# Patient Record
Sex: FEMALE | Race: ASIAN | Employment: OTHER | URBAN - METROPOLITAN AREA
[De-identification: names, ages, dates, MRNs, and addresses within clinical notes are randomized per-mention and may not be internally consistent; named-entity substitution may affect disease eponyms.]

---

## 2023-10-03 ENCOUNTER — APPOINTMENT (OUTPATIENT)
Dept: OTOLARYNGOLOGY | Facility: CLINIC | Age: 78
End: 2023-10-03
Payer: MEDICARE

## 2023-10-03 ENCOUNTER — APPOINTMENT (OUTPATIENT)
Dept: AUDIOLOGY | Facility: CLINIC | Age: 78
End: 2023-10-03
Payer: MEDICARE

## 2023-11-01 ENCOUNTER — CLINICAL SUPPORT (OUTPATIENT)
Dept: ORTHOPEDIC SURGERY | Facility: CLINIC | Age: 78
End: 2023-11-01
Payer: MEDICARE

## 2023-11-01 ENCOUNTER — OFFICE VISIT (OUTPATIENT)
Dept: ORTHOPEDIC SURGERY | Facility: CLINIC | Age: 78
End: 2023-11-01
Payer: MEDICARE

## 2023-11-01 VITALS — BODY MASS INDEX: 18.95 KG/M2 | WEIGHT: 103 LBS | HEIGHT: 62 IN

## 2023-11-01 DIAGNOSIS — M25.551 HIP PAIN, RIGHT: ICD-10-CM

## 2023-11-01 DIAGNOSIS — M24.151 DEGENERATIVE TEAR OF ACETABULAR LABRUM OF RIGHT HIP: Primary | ICD-10-CM

## 2023-11-01 PROCEDURE — 73502 X-RAY EXAM HIP UNI 2-3 VIEWS: CPT | Mod: RIGHT SIDE | Performed by: ORTHOPAEDIC SURGERY

## 2023-11-01 PROCEDURE — 1159F MED LIST DOCD IN RCRD: CPT | Performed by: ORTHOPAEDIC SURGERY

## 2023-11-01 PROCEDURE — 99203 OFFICE O/P NEW LOW 30 MIN: CPT | Performed by: ORTHOPAEDIC SURGERY

## 2023-11-01 PROCEDURE — 1126F AMNT PAIN NOTED NONE PRSNT: CPT | Performed by: ORTHOPAEDIC SURGERY

## 2023-11-01 PROCEDURE — 1036F TOBACCO NON-USER: CPT | Performed by: ORTHOPAEDIC SURGERY

## 2023-11-01 RX ORDER — GABAPENTIN 300 MG/1
300 CAPSULE ORAL 3 TIMES DAILY
COMMUNITY
Start: 2023-06-29

## 2023-11-01 RX ORDER — LISINOPRIL 20 MG/1
TABLET ORAL EVERY 24 HOURS
COMMUNITY

## 2023-11-01 RX ORDER — TRAZODONE HYDROCHLORIDE 100 MG/1
TABLET ORAL
COMMUNITY
Start: 2012-06-11

## 2023-11-01 RX ORDER — PAROXETINE 10 MG/1
10 TABLET, FILM COATED ORAL DAILY
COMMUNITY
Start: 2023-03-06

## 2023-11-01 RX ORDER — LANOLIN ALCOHOL/MO/W.PET/CERES
CREAM (GRAM) TOPICAL
COMMUNITY

## 2023-11-01 RX ORDER — MULTIVITAMIN
1 TABLET ORAL
COMMUNITY

## 2023-11-01 ASSESSMENT — ENCOUNTER SYMPTOMS
HEADACHES: 0
RHINORRHEA: 0
COUGH: 0
VOMITING: 0
SORE THROAT: 1
ABDOMINAL PAIN: 0
NECK PAIN: 1
DIARRHEA: 0

## 2023-11-01 ASSESSMENT — PAIN SCALES - GENERAL: PAINLEVEL_OUTOF10: 0 - NO PAIN

## 2023-11-01 ASSESSMENT — PAIN - FUNCTIONAL ASSESSMENT: PAIN_FUNCTIONAL_ASSESSMENT: 0-10

## 2023-11-01 NOTE — PROGRESS NOTES
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  The medical intake form was reviewed and scanned into the electronic medical record for future use.  History is otherwise negative except as stated in the history of present illness.    Physical exam:    General: Alert and oriented to person place and time.  No acute distress and breathing comfortably, pleasant and cooperative with examination.    Head and neck exam: Head is normocephalic atraumatic.    Neck: Supple no visible swelling or deformity.    Cardiovascular: Good perfusion to affected extremities without signs or symptoms of chest pain.    Lungs no audible wheezing or labored breathing on examination.    Abdominal exam: Nondistended nontender    Extremities: The affected right hip was examined and inspected.  There was tenderness to touch along the groin and over the lateral aspect of the hip over the bursal area.  Hip joint occasionally displayed catching, locking and mechanical symptoms.    The skin was intact without breakdown or open wound.  Old incisions of present were all healed.  There was mild crepitus seen with internal and external rotation and range of motion without evidence of gross instability.    Inspection of the low back showed normal curvature integrity of the skin.  The strength and stability of the low back and ligaments were within normal limits.    There was a normal straight leg raise with no foot drop, numbness or tingling in the bilateral lower extremities.  Sensation, reflexes and pulses in the foot and ankle are preserved and present.  There was no obvious effusion.  Range  of motion showed flexion to 90 degrees, abduction to 20 degrees, external rotation to 5 degrees and 0 degrees of internal rotation.  The patient had the ability to bear weight but with discomfort.  The patient's gait Antalgic and secondary to discomfort        Diagnostic studies: X-rays 2 views right hip done at our [place] office show real no bony abnormality well-preserved hip joint  But limited view we have shows fairly severe osteoarthritic change of the lumbar sacral spine  Impression: Right hip groin pain possible labral tear      Plan: MRI right hip    Patient has had conservative treatment and hip groin pain for over 1 year    If the MRI shows labral tearing directed fluoroscopic intra-articular injection may be recommended    If the hip MRI is normal we may refer her to one of our spine colleagues for evaluation

## 2023-11-02 ENCOUNTER — OFFICE VISIT (OUTPATIENT)
Dept: OTOLARYNGOLOGY | Facility: CLINIC | Age: 78
End: 2023-11-02
Payer: MEDICARE

## 2023-11-02 VITALS
HEIGHT: 62 IN | DIASTOLIC BLOOD PRESSURE: 77 MMHG | BODY MASS INDEX: 18.95 KG/M2 | SYSTOLIC BLOOD PRESSURE: 164 MMHG | WEIGHT: 103 LBS | TEMPERATURE: 96.4 F

## 2023-11-02 DIAGNOSIS — R07.0 THROAT PAIN: Primary | ICD-10-CM

## 2023-11-02 DIAGNOSIS — H92.01 RIGHT EAR PAIN: ICD-10-CM

## 2023-11-02 PROCEDURE — 1160F RVW MEDS BY RX/DR IN RCRD: CPT | Performed by: OTOLARYNGOLOGY

## 2023-11-02 PROCEDURE — 1036F TOBACCO NON-USER: CPT | Performed by: OTOLARYNGOLOGY

## 2023-11-02 PROCEDURE — 1126F AMNT PAIN NOTED NONE PRSNT: CPT | Performed by: OTOLARYNGOLOGY

## 2023-11-02 PROCEDURE — 1159F MED LIST DOCD IN RCRD: CPT | Performed by: OTOLARYNGOLOGY

## 2023-11-02 PROCEDURE — 31575 DIAGNOSTIC LARYNGOSCOPY: CPT | Performed by: OTOLARYNGOLOGY

## 2023-11-02 PROCEDURE — 99203 OFFICE O/P NEW LOW 30 MIN: CPT | Performed by: OTOLARYNGOLOGY

## 2023-11-02 RX ORDER — ATORVASTATIN CALCIUM 20 MG/1
20 TABLET, FILM COATED ORAL
COMMUNITY
Start: 2019-05-20

## 2023-11-02 NOTE — PROGRESS NOTES
Impression:  1. Throat pain        2. Right ear pain           RECOMMENDATIONS/PLAN :  I reassured the patient there is no evidence of any mass or swelling along her upper airway.  I want her to avoid pushing on that right side of her neck and see if her pain goes away.  I reassured the patient that her ear appears normal and more than likely she has referred pain to the ear from her right neck.  She will let me know if she starts clearing her throat more frequently or if she develops a fullness sensation in the throat.  At that point we would start her on omeprazole 20 mg daily.  She will continue to drink plenty of water and avoid excessive caffeine.  She will call over the next 4 weeks and let me know how she is doing    Subjective   Patient ID:     Johny Nicholson is a 78 y.o. female who presents to the office today complaining of right neck pain that typically occurs when she is pushing deep along the right portion of her neck with her fingers.  She denies any trauma to the head or neck.  She further denies choking on foods or liquids.  She has not had any evidence of hemoptysis or hematemesis.  No unexplained loss of weight night sweats or fatigue.  She does complain of some right ear pain.  No drainage from her ears and no changes in hearing.  No upper respiratory complaints nasal congestion sinus issues fever or chills.    ROS:  A detailed 12 system review of systems is noted on the intake form has been reviewed with the patient with details noted in the HPI and scanned into the patient's medical record.    Objective     No past medical history on file.     No past surgical history on file.     No Known Allergies       Current Outpatient Medications:     atorvastatin (Lipitor) 20 mg tablet, Take 1 tablet (20 mg) by mouth once daily., Disp: , Rfl:     lisinopril 20 mg tablet, once every 24 hours., Disp: , Rfl:     multivit with min-folic acid 0.4 mg tablet, Take 1 tablet by mouth., Disp: , Rfl:     PARoxetine  "(Paxil) 10 mg tablet, Take 1 tablet (10 mg) by mouth once daily., Disp: , Rfl:     pyridoxine (Vitamin B-6) 50 mg tablet, 1 tablet Orally, Disp: , Rfl:     traZODone (Desyrel) 100 mg tablet, Trazodone HCl06/11/201250 MGOnce a day as needed Orally 90 daysTsYuni collinsRashidaill, Disp: , Rfl:     gabapentin (Neurontin) 300 mg capsule, Take 1 capsule (300 mg) by mouth 3 times a day., Disp: , Rfl:     oxymetazoline 0.05 % nasal spray, every 12 hours., Disp: , Rfl:      Tobacco Use: Medium Risk (11/2/2023)    Patient History     Smoking Tobacco Use: Former     Smokeless Tobacco Use: Never     Passive Exposure: Not on file        Alcohol Use: Not on file        Social History     Substance and Sexual Activity   Drug Use Not on file        Physical Exam:  Visit Vitals  /77   Temp 35.8 °C (96.4 °F) (Temporal)   Ht 1.575 m (5' 2\")   Wt 46.7 kg (103 lb)   BMI 18.84 kg/m²   Smoking Status Former   BSA 1.43 m²      General: Patient is alert, oriented, cooperative in no apparent distress.  Head: Normocephalic, atraumatic.  Eyes: PERRL, EOMI, Conjunctiva is clear. No nystagmus.  Ears: Right Ear-- Pinna is normal.  External auditory canal is patent, no lesions. Tympanic membrane is [intact, translucent and has good mobility with my pneumatic otoscope. No effusion].  Mastoid is nontender.  Left ear-- Pinna is normal.  External auditory canal is patent, no lesions.  Tympanic membrane is [intact, translucent and has good mobility with my pneumatic otoscope.  No effusion].  Mastoid is nontender.  Nose: Septum is relatively straight.  No septal perforation or lesions. No septal hematoma/ seroma.  No signs of bleeding.  Inferior turbinates are normal.   No evidence of intranasal polyps.    Throat:  Floor of mouth is clear, no masses.  Tongue appears normal, no lesions or masses. Gums, gingiva, buccal mucosa appear pink and moist, no lesions. Teeth are in good repair.  No obvious dental infections.  Peritonsillar regions appear " symmetric without swelling.  Hard and soft palate appear normal, no obvious cleft. Uvula is midline.  Oropharynx: No lesions. Retropharyngeal wall is flat.  No active postnasal drip.  Neck: Supple,  no lymphadenopathy.  No masses.  Salivary Glands: Symmetric bilaterally.  No palpable masses.  No evidence of acute infection or salivary stones  Neurologic: Cranial Nerves 2-12 are grossly intact without focal deficits. Cerebellar function testing is normal.     Results:   []  Procedure:   Pre Op Diagnosis: Right throat pain with referred right ear pain-rule out upper airway mass  Post Op Diagnosis: Same  Procedure: Flexible Nasopharyngolaryngoscopy  Surgeon: Luis Brooks DO  Assist: None  Anesthesia: Topical Lidocaine 4%/ 0.05% Afrin 1:1 mixture  EBL: None  Complications: None  Disposition: The patient tolerated the procedure well. There were no complications.    Procedure:  After informed consent was obtained with the risks, benefits, complications and alternatives explained to the patient/ guardian, the patient was sat up in the ENT chair and the nose was anesthetized and decongested with topical  4% lidocaine and 0.05% Afrin. After allowing this to take effect, the flexible nasopharyngoscope was advanced thru the nostrils and down to the larynx. The following areas were visualized:  The nasal passages, septum, nasopharynx, sinus ostia, base of tongue, epiglottis, true and false vocal folds, arytenoids, post cricoid region and subglottis were all examined and found to be normal except as follows:  Septum is relatively straight.  Her ostiomeatal complexes are clear bilaterally.  No pus polyps or lesions.  Nasopharynx is clear.  No postnasal drip.  Base of tongue reveals prominent blood vessels with mild lingual tonsil hypertrophy.  No masses.  Vallecula is clear.  Epiglottis is normal.  True and false vocal cords are approximating equally bilaterally.  No nodules polyps or lesions.  Piriform sinuses are clear.   Arytenoids show mild edema consistent with mild laryngopharyngeal reflux.  Subglottis is clear.      The patient tolerated the procedure well and there were no complications.      Luis Brooks, DO

## 2023-11-24 ENCOUNTER — APPOINTMENT (OUTPATIENT)
Dept: RADIOLOGY | Facility: CLINIC | Age: 78
End: 2023-11-24
Payer: MEDICARE

## 2023-11-24 ENCOUNTER — APPOINTMENT (OUTPATIENT)
Dept: RADIOLOGY | Facility: HOSPITAL | Age: 78
End: 2023-11-24
Payer: MEDICARE

## 2023-11-27 ENCOUNTER — ANCILLARY PROCEDURE (OUTPATIENT)
Dept: RADIOLOGY | Facility: CLINIC | Age: 78
End: 2023-11-27
Payer: MEDICARE

## 2023-11-27 DIAGNOSIS — M24.151 DEGENERATIVE TEAR OF ACETABULAR LABRUM OF RIGHT HIP: ICD-10-CM

## 2023-11-27 PROCEDURE — 73721 MRI JNT OF LWR EXTRE W/O DYE: CPT | Mod: RT

## 2023-11-27 PROCEDURE — 73721 MRI JNT OF LWR EXTRE W/O DYE: CPT | Mod: RIGHT SIDE | Performed by: RADIOLOGY

## 2023-12-13 ENCOUNTER — OFFICE VISIT (OUTPATIENT)
Dept: ORTHOPEDIC SURGERY | Facility: CLINIC | Age: 78
End: 2023-12-13
Payer: MEDICARE

## 2023-12-13 DIAGNOSIS — M24.151 DEGENERATIVE TEAR OF ACETABULAR LABRUM OF RIGHT HIP: ICD-10-CM

## 2023-12-13 PROCEDURE — 1036F TOBACCO NON-USER: CPT | Performed by: ORTHOPAEDIC SURGERY

## 2023-12-13 PROCEDURE — 1160F RVW MEDS BY RX/DR IN RCRD: CPT | Performed by: ORTHOPAEDIC SURGERY

## 2023-12-13 PROCEDURE — 1126F AMNT PAIN NOTED NONE PRSNT: CPT | Performed by: ORTHOPAEDIC SURGERY

## 2023-12-13 PROCEDURE — 99213 OFFICE O/P EST LOW 20 MIN: CPT | Performed by: ORTHOPAEDIC SURGERY

## 2023-12-13 PROCEDURE — 1159F MED LIST DOCD IN RCRD: CPT | Performed by: ORTHOPAEDIC SURGERY

## 2023-12-15 DIAGNOSIS — M24.151 DEGENERATIVE TEAR OF ACETABULAR LABRUM OF RIGHT HIP: Primary | ICD-10-CM

## 2023-12-15 RX ORDER — OXYMETAZOLINE HYDROCHLORIDE 0.05 G/100ML
2 SPRAY NASAL 2 TIMES DAILY
COMMUNITY

## 2023-12-15 RX ORDER — TRAZODONE HYDROCHLORIDE 100 MG/1
100 TABLET ORAL NIGHTLY PRN
COMMUNITY

## 2023-12-15 RX ORDER — ATORVASTATIN CALCIUM 20 MG/1
20 TABLET, FILM COATED ORAL DAILY
COMMUNITY
Start: 2023-10-23

## 2023-12-15 RX ORDER — GABAPENTIN 300 MG/1
300 CAPSULE ORAL 3 TIMES DAILY
COMMUNITY

## 2023-12-15 RX ORDER — PYRIDOXINE HCL (VITAMIN B6) 50 MG
50 TABLET ORAL DAILY
COMMUNITY

## 2023-12-15 RX ORDER — PAROXETINE 10 MG/1
10 TABLET, FILM COATED ORAL EVERY MORNING
COMMUNITY
Start: 2023-11-21

## 2023-12-15 RX ORDER — LISINOPRIL 20 MG/1
20 TABLET ORAL DAILY
COMMUNITY
Start: 2023-10-23

## 2024-01-03 ENCOUNTER — HOSPITAL ENCOUNTER (OUTPATIENT)
Dept: INTERVENTIONAL RADIOLOGY/VASCULAR | Age: 79
Discharge: HOME | End: 2024-01-05
Payer: COMMERCIAL

## 2024-01-03 VITALS
RESPIRATION RATE: 16 BRPM | SYSTOLIC BLOOD PRESSURE: 150 MMHG | DIASTOLIC BLOOD PRESSURE: 74 MMHG | HEART RATE: 51 BPM | OXYGEN SATURATION: 98 %

## 2024-01-03 DIAGNOSIS — M24.151 DEGENERATIVE TEAR OF ACETABULAR LABRUM OF RIGHT HIP: ICD-10-CM

## 2024-01-03 PROCEDURE — 20610 DRAIN/INJ JOINT/BURSA W/O US: CPT

## 2024-01-03 PROCEDURE — 6360000002 HC RX W HCPCS: Performed by: RADIOLOGY

## 2024-01-03 PROCEDURE — 6360000004 HC RX CONTRAST MEDICATION: Performed by: RADIOLOGY

## 2024-01-03 PROCEDURE — 2500000003 HC RX 250 WO HCPCS: Performed by: RADIOLOGY

## 2024-01-03 PROCEDURE — 77002 NEEDLE LOCALIZATION BY XRAY: CPT

## 2024-01-03 RX ORDER — LIDOCAINE HYDROCHLORIDE 20 MG/ML
INJECTION, SOLUTION INFILTRATION; PERINEURAL PRN
Status: COMPLETED | OUTPATIENT
Start: 2024-01-03 | End: 2024-01-03

## 2024-01-03 RX ORDER — BUPIVACAINE HYDROCHLORIDE 5 MG/ML
INJECTION, SOLUTION PERINEURAL PRN
Status: COMPLETED | OUTPATIENT
Start: 2024-01-03 | End: 2024-01-03

## 2024-01-03 RX ORDER — IOPAMIDOL 408 MG/ML
INJECTION, SOLUTION INTRATHECAL PRN
Status: COMPLETED | OUTPATIENT
Start: 2024-01-03 | End: 2024-01-03

## 2024-01-03 RX ORDER — TRIAMCINOLONE ACETONIDE 40 MG/ML
INJECTION, SUSPENSION INTRA-ARTICULAR; INTRAMUSCULAR PRN
Status: COMPLETED | OUTPATIENT
Start: 2024-01-03 | End: 2024-01-03

## 2024-01-03 RX ADMIN — IOPAMIDOL 5 ML: 408 INJECTION, SOLUTION INTRATHECAL at 11:18

## 2024-01-03 RX ADMIN — LIDOCAINE HYDROCHLORIDE 8 ML: 20 INJECTION, SOLUTION INFILTRATION; PERINEURAL at 11:17

## 2024-01-03 RX ADMIN — BUPIVACAINE HYDROCHLORIDE 3 ML: 5 INJECTION, SOLUTION PERINEURAL at 11:19

## 2024-01-03 RX ADMIN — TRIAMCINOLONE ACETONIDE 80 MG: 40 INJECTION, SUSPENSION INTRA-ARTICULAR; INTRAMUSCULAR at 11:19

## 2024-01-03 NOTE — DISCHARGE INSTRUCTIONS
follow all instructions on the label.    INSTRUCTIONS OR COMMENTS RELATIVE TO SPECIFIC EXAM PERFORMED:   - Put ice or a cold pack on the area for 10 to 20 minutes at a time. Put a thin cloth between the ice and your skin.  - You may have some soreness or swelling in the joint.  - You may hear your joint click or make other noises for a few days.    Call your doctor now or seek immediate medical care if:    You have signs of infection, such as:  Increased pain, swelling, warmth, or redness.  Red streaks leading from the site.  Pus draining from the site.  A fever, greater than 100 degrees F  For large amount of bleeding or drainage.       Please contact Dr. Arroyo's office with any questions or concerns post procedure at [phone]. You may also call [phone] and ask to speak with a Radiology RN.  If you are unable to contact the above physician and you feel you have a major problem, please go to the Emergency Room for treatment.

## 2024-01-03 NOTE — OR NURSING
NO SEDATION    1055 Pt brought to specials. Pt alert and oriented. Procedure explained and consent signed. Minimally assisted pt onto table in supine position. States pain 3/10.  History, allergies and medications verified. Vital signs obtained.    1110 Dr Arroyo marked site right hip site using fluoro imaging.    1111 Time out performed.     1114 Right hip site marked then cleansed with chloraprep. After 3 minute dry time, prepped and draped in a sterile fashion.    1117 2% lidocaine used to numb procedure site, see eMar.     1118 Spinal needle 20G x 3.5\" inserted and contrast given to confirm placement using fluoro guidance, see eMar.     1119 Medication cocktail instilled through spinal needle under fluoro guidance, see eMar.     Pt tolerating procedure well and verbal support given throughout.     Needle removed, band aid applied, no bleeding noted.     Pt assisted off table. Ambulating without difficulty. States pain has improved. Discharge instructions provided and pt verbalized understanding.    Accompanied pt to waiting room for d/c home.

## 2024-01-17 ENCOUNTER — APPOINTMENT (OUTPATIENT)
Dept: RADIOLOGY | Facility: HOSPITAL | Age: 79
End: 2024-01-17
Payer: MEDICARE

## 2025-06-03 ENCOUNTER — HOSPITAL ENCOUNTER (OUTPATIENT)
Dept: RADIOLOGY | Facility: CLINIC | Age: 80
Discharge: HOME | End: 2025-06-03
Payer: MEDICARE

## 2025-06-03 ENCOUNTER — APPOINTMENT (OUTPATIENT)
Dept: ORTHOPEDIC SURGERY | Facility: CLINIC | Age: 80
End: 2025-06-03
Payer: MEDICARE

## 2025-06-03 DIAGNOSIS — M65.30 ACQUIRED TRIGGER FINGER: ICD-10-CM

## 2025-06-03 DIAGNOSIS — G56.01 CARPAL TUNNEL SYNDROME OF RIGHT WRIST: ICD-10-CM

## 2025-06-03 DIAGNOSIS — M54.12 CERVICAL RADICULOPATHY AT C5: ICD-10-CM

## 2025-06-03 DIAGNOSIS — G56.03 BILATERAL CARPAL TUNNEL SYNDROME: Primary | ICD-10-CM

## 2025-06-03 DIAGNOSIS — M19.049 CMC ARTHRITIS: ICD-10-CM

## 2025-06-03 DIAGNOSIS — M79.641 RIGHT HAND PAIN: ICD-10-CM

## 2025-06-03 DIAGNOSIS — M79.642 LEFT HAND PAIN: ICD-10-CM

## 2025-06-03 PROCEDURE — 1036F TOBACCO NON-USER: CPT | Performed by: ORTHOPAEDIC SURGERY

## 2025-06-03 PROCEDURE — 73130 X-RAY EXAM OF HAND: CPT | Mod: RIGHT SIDE | Performed by: ORTHOPAEDIC SURGERY

## 2025-06-03 PROCEDURE — 73130 X-RAY EXAM OF HAND: CPT | Mod: LEFT SIDE | Performed by: ORTHOPAEDIC SURGERY

## 2025-06-03 PROCEDURE — 20600 DRAIN/INJ JOINT/BURSA W/O US: CPT | Performed by: ORTHOPAEDIC SURGERY

## 2025-06-03 PROCEDURE — 20600 DRAIN/INJ JOINT/BURSA W/O US: CPT | Mod: LT | Performed by: ORTHOPAEDIC SURGERY

## 2025-06-03 PROCEDURE — 20550 NJX 1 TENDON SHEATH/LIGAMENT: CPT | Performed by: ORTHOPAEDIC SURGERY

## 2025-06-03 PROCEDURE — 99202 OFFICE O/P NEW SF 15 MIN: CPT | Mod: 25 | Performed by: ORTHOPAEDIC SURGERY

## 2025-06-03 PROCEDURE — 1159F MED LIST DOCD IN RCRD: CPT | Performed by: ORTHOPAEDIC SURGERY

## 2025-06-03 PROCEDURE — 20526 THER INJECTION CARP TUNNEL: CPT | Performed by: ORTHOPAEDIC SURGERY

## 2025-06-03 PROCEDURE — 73130 X-RAY EXAM OF HAND: CPT | Mod: RT

## 2025-06-03 PROCEDURE — 2500000004 HC RX 250 GENERAL PHARMACY W/ HCPCS (ALT 636 FOR OP/ED): Performed by: ORTHOPAEDIC SURGERY

## 2025-06-03 PROCEDURE — 20600 DRAIN/INJ JOINT/BURSA W/O US: CPT | Mod: RT | Performed by: ORTHOPAEDIC SURGERY

## 2025-06-03 PROCEDURE — 73130 X-RAY EXAM OF HAND: CPT | Mod: LT

## 2025-06-03 PROCEDURE — 20526 THER INJECTION CARP TUNNEL: CPT | Mod: RT | Performed by: ORTHOPAEDIC SURGERY

## 2025-06-03 PROCEDURE — 20550 NJX 1 TENDON SHEATH/LIGAMENT: CPT | Mod: LT | Performed by: ORTHOPAEDIC SURGERY

## 2025-06-03 PROCEDURE — 99214 OFFICE O/P EST MOD 30 MIN: CPT | Performed by: ORTHOPAEDIC SURGERY

## 2025-06-03 RX ORDER — LIDOCAINE HYDROCHLORIDE 10 MG/ML
0.5 INJECTION, SOLUTION INFILTRATION; PERINEURAL
Status: COMPLETED | OUTPATIENT
Start: 2025-06-03 | End: 2025-06-03

## 2025-06-03 RX ORDER — DULOXETIN HYDROCHLORIDE 20 MG/1
40 CAPSULE, DELAYED RELEASE ORAL
COMMUNITY
Start: 2024-08-21 | End: 2025-08-21

## 2025-06-03 RX ORDER — LIDOCAINE HYDROCHLORIDE 10 MG/ML
1 INJECTION, SOLUTION INFILTRATION; PERINEURAL
Status: COMPLETED | OUTPATIENT
Start: 2025-06-03 | End: 2025-06-03

## 2025-06-03 RX ORDER — TRAMADOL HYDROCHLORIDE 50 MG/1
50 TABLET, FILM COATED ORAL EVERY 8 HOURS PRN
COMMUNITY
Start: 2025-05-15 | End: 2025-06-04

## 2025-06-03 RX ADMIN — LIDOCAINE HYDROCHLORIDE 0.5 ML: 10 INJECTION, SOLUTION INFILTRATION; PERINEURAL at 14:04

## 2025-06-03 RX ADMIN — TRIAMCINOLONE ACETONIDE 5 MG: 10 INJECTION, SUSPENSION INTRA-ARTICULAR; INTRALESIONAL at 14:04

## 2025-06-03 RX ADMIN — TRIAMCINOLONE ACETONIDE 10 MG: 10 INJECTION, SUSPENSION INTRA-ARTICULAR; INTRALESIONAL at 14:04

## 2025-06-03 RX ADMIN — LIDOCAINE HYDROCHLORIDE 1 ML: 10 INJECTION, SOLUTION INFILTRATION; PERINEURAL at 14:04

## 2025-06-03 NOTE — PROGRESS NOTES
"    6/3/2025    Chief Complaint   Patient presents with    Right Hand - New Patient Visit, Pain     Xrays today        History of Present Illness:  Patient Johny Nicholson , 80 y.o. female, presents today, 6/3/2025, for evaluation of bilateral hand and wrist pain, numbness, and weakness.  Patient's most notable complaint is burning pain and numbness and tingling to the right hand.  She reports this affects all 5 digits, but is most bothersome to the thumb and index finger.  This wakes her from sleep at nighttime.  She has tried bracing with little relief.  She is a right-hand-dominant individual.  The symptoms began in December.  Originally involved pain that radiated all the way from the tips of the digits up to the neck and a substantial amount of shoulder pain.  She was seen by primary care and then neurology who diagnosed her with a herniated C5 disc.  She underwent injection in the neck that resolved her pain from the neck all the way down to the wrist but she never got relief of the burning stinging pain into the hand and digits.  She then followed up with neurology that felt she may have component of carpal tunnel syndrome and recommended to hand surgery referral.  She also has baseline history of bilateral thumb pain and difficulties with pinch and  that she relates to thumb arthritis.  She does wear braces for this intermittently for activities.  She also reports mechanical triggering with flexion and extension of the left long finger that she states is \"obvious trigger digit\" that has been ongoing for a few months.  No discrete injury or trauma associated with symptom onset on the left side as well.       Review of Systems:   GENERAL: Negative  GI: Negative  MUSCULOSKELETAL: See HPI  SKIN: Negative  NEURO:  Negative     Physical Exam:  GENERAL:  Alert and oriented to person, place, and time.  No acute distress and breathing comfortably; pleasant and cooperative with the examination.  HEENT:  Head is " normocephalic and atraumatic.  NECK:  Supple, no visible swelling.  CARDIOVASCULAR:  No palpable tachycardia.  LUNGS:  No audible wheezing or labored breathing.  ABDOMEN:  Nondistended.  Extremities: Evaluation of bilateral upper extremities finds the patient to have a palpable radial artery at the wrist with brisk capillary refill to all digits. The patient has intact sensorium to axillary, radial, median and ulnar nerves. There are no open wounds. There are no signs of infection. There is no evidence of lymphedema or lymphatic streaking. The patient has supple compartments of the bilateral arms, forearms and hands.  Tenderness to palpation of the bilateral thumb knits at the CMC articulations a positive basilar grind test bilaterally.  Mild tenderness over the A1 pulley of the left long finger with tesha mechanical triggering with flexion and extension on exam.  On the right side she demonstrates positive Tinel's over the median nerve at the right wrist.  Positive Phalen's and direct compression maneuver as well.     Imaging/Test Results:  Bilateral hand and wrist films today show no acute fracture or dislocation.  There is diffuse arthritic change noted about the hand and wrist but this is most notable at the bilateral thumb CMC articulations.  Both thumb CMC show evidence of severe arthritic change with loss of joint space height, narrowing, osteophyte formation.     Assessment:  #1: Complex right upper extremity pain and numbness and tingling with suspected component of carpal tunnel syndrome with possible overlying component of cervical radiculopathy.  #2: Bilateral thumb CMC arthritic change.  #3: Left long finger trigger digit.     Plan:  Treatment options were reviewed including operative and nonoperative strategies.  Recommendations were made for initial nonoperative management in favor of left long finger trigger digit injection, bilateral thumb CMC injections, and trial of right diagnostic/therapeutic  carpal tunnel injection.  Continue with nighttime bracing regiment for this.  Will give her bilateral cool comfort braces today to be worn at the hands for comfort and activities as tolerated.  We also recommend EMG/nerve conduction study of the right upper extremity to assess for presence of carpal tunnel syndrome as well as cervical radiculopathy and possible component of polyneuropathy as well.  Patient was amenable to this.  Injections were performed by Dr. Medrano and tolerated well.  Continue with weightbearing activities to tolerance in the interim.  Follow-up in about 6 weeks after EMG has been performed.  All questions answered at today's visit.  We discussed that she could follow-up with neurology in the interim as planned.    Hand / UE Inj/Asp: R thumb CMC for osteoarthritis on 6/3/2025 2:04 PM  Indications: pain  Details: 25 G needle, dorsal approach  Medications: 5 mg triamcinolone acetonide 10 mg/mL; 0.5 mL lidocaine 10 mg/mL (1 %)  Outcome: tolerated well, no immediate complications    Right Thumb Carpometacarpal Joint Injection: It was explained to the patient that the risks of a steroid injection include but are not limited to infection, local skin irritation, skin atrophy, calcification, continued pain and discomfort, elevated blood sugar, burning, failure to relieve pain, and possible late infection. The patient verbalized good insight and verbalized consent for the injection. It was further explained that the postinjection discomfort can be alleviated with additional medications, ice, elevation, and rest over the first 24 hours, and that these modalities are recommended.    Using aseptic technique, a solution containing 0.5 cc of 5 mg of Kenalog and 0.5 mL of 1% lidocaine without epinephrine was injected intraarticularly to the right thumb carpometacarpal joint. Digital palpation was used to localize the CMC articulation about the dorsal radial aspect of the joint. Gentle traction was then  imparted to the thumb distally and a 25-gauge needle was advanced through the skin, subcutaneous tissue and capsule. The injection was then administered and the patient tolerated the injection well. A band-aid was then placed. It should be noted that ethyl chloride spray was used to make the injection delivery more comfortable for the patient.  Procedure, treatment alternatives, risks and benefits explained, specific risks discussed. Consent was given by the patient. Immediately prior to procedure a time out was called to verify the correct patient, procedure, equipment, support staff and site/side marked as required. Patient was prepped and draped in the usual sterile fashion.       Hand / UE Inj/Asp: L long A1 for trigger finger on 6/3/2025 2:04 PM  Indications: pain and tendon swelling  Details: 25 G needle, volar approach  Medications: 5 mg triamcinolone acetonide 10 mg/mL; 0.5 mL lidocaine 10 mg/mL (1 %)  Outcome: tolerated well, no immediate complications  Procedure, treatment alternatives, risks and benefits explained, specific risks discussed. Consent was given by the patient. Immediately prior to procedure a time out was called to verify the correct patient, procedure, equipment, support staff and site/side marked as required. Patient was prepped and draped in the usual sterile fashion.       Hand / UE Inj/Asp: L thumb CMC for osteoarthritis on 6/3/2025 2:04 PM  Indications: pain  Details: 25 G needle, dorsal approach  Medications: 5 mg triamcinolone acetonide 10 mg/mL; 0.5 mL lidocaine 10 mg/mL (1 %)  Outcome: tolerated well, no immediate complications    Left Thumb Carpometacarpal Joint Injection: It was explained to the patient that the risks of a steroid injection include but are not limited to infection, local skin irritation, skin atrophy, calcification, continued pain and discomfort, elevated blood sugar, burning, failure to relieve pain, and possible late infection. The patient verbalized good  insight and verbalized consent for the injection. It was further explained that the postinjection discomfort can be alleviated with additional medications, ice, elevation, and rest over the first 24 hours, and that these modalities are recommended.    Using aseptic technique, a solution containing 0.5 cc of 5 mg of Kenalog and 0.5 mL of 1% lidocaine without epinephrine was injected intraarticularly to the left thumb carpometacarpal joint. Digital palpation was used to localize the CMC articulation about the dorsal radial aspect of the joint. Gentle traction was then imparted to the thumb distally and a 25-gauge needle was advanced through the skin, subcutaneous tissue and capsule. The injection was then administered and the patient tolerated the injection well. A band-aid was then placed. It should be noted that ethyl chloride spray was used to make the injection delivery more comfortable for the patient.  Procedure, treatment alternatives, risks and benefits explained, specific risks discussed. Consent was given by the patient. Immediately prior to procedure a time out was called to verify the correct patient, procedure, equipment, support staff and site/side marked as required. Patient was prepped and draped in the usual sterile fashion.       Hand / UE Inj/Asp: R carpal tunnel for carpal tunnel syndrome on 6/3/2025 2:04 PM  Indications: diagnostic and therapeutic  Details: 25 G needle, volar approach  Medications: 10 mg triamcinolone acetonide 10 mg/mL; 1 mL lidocaine 10 mg/mL (1 %)  Outcome: tolerated well, no immediate complications    Right Carpal Tunnel Injection: It was explained to the patient that the risks of a steroid injection include but are not limited to infection, local skin irritation, skin atrophy, calcification, continued pain and discomfort, elevated blood sugar, burning, failure to relieve pain, and possible late infection. The patient verbalized good insight and verbalized consent for the  injection. It was further explained that the post-injection discomfort can be alleviated with additional medications, ice, elevation, and rest over the first 24 hours, and that these modalities are recommended.  After informed consent was provided, patient identification was confirmed, and allergies were verified, the patient was appropriately positioned. The site was marked and time-out performed.    Using aseptic technique, a solution containing 1 mL of 10 mg of Kenalog and 1 mL of 1% lidocaine without epinephrine was injected into the patient's right carpal tunnel. A 25-gauge needle was inserted just ulnar to the anatomic course of the palmaris longus tendon at the level of the distal wrist flexion crease. It was slowly advanced deep to the distal antebrachial fascia. The solution was then injected slowly, taking care to ensure that the patient experienced no paresthesias during the injection of the solution. After injection of the solution, the patient was asked to perform active flexion and extension of the digits and wrist to help disperse the solution. A band-aid was then placed at the injection site. The patient tolerated this right carpal tunnel injection well.      Procedure, treatment alternatives, risks and benefits explained, specific risks discussed. Consent was given by the patient. Immediately prior to procedure a time out was called to verify the correct patient, procedure, equipment, support staff and site/side marked as required. Patient was prepped and draped in the usual sterile fashion.         In a face to face encounter, I performed a history and physical examination, discussed pertinent diagnostic studies if indicated, and discussed diagnosis and management strategies with both the patient and the mid-level provider. I reviewed the mid-level's note and agree with the documented findings and plan of care.  Patient presents today for evaluation of the bilateral upper extremities.  She  describes bilateral basilar thumb pain with left long finger trigger finger and symptoms compatible with right carpal tunnel syndrome.  Questionable component of cervical radiculopathy.  Positive Tinel's over course of median nerve to right wrist.  Focal tenderness and triggering to left long finger.  Focal tenderness and deformity consistent with bilateral thumb CMC arthritis.  This is evident radiographically as well.  Treatment options were discussed including operative and nonoperative strategies.  Patient elects for bilateral thumb CMC injections right carpal tunnel injection and left long finger trigger finger injection.  Follow-up after EMG complete.  Patient is agreeable with this strategy.

## 2025-06-09 ENCOUNTER — APPOINTMENT (OUTPATIENT)
Dept: ORTHOPEDIC SURGERY | Facility: CLINIC | Age: 80
End: 2025-06-09
Payer: MEDICARE

## 2025-07-03 ENCOUNTER — HOSPITAL ENCOUNTER (OUTPATIENT)
Dept: NEUROLOGY | Facility: HOSPITAL | Age: 80
Discharge: HOME | End: 2025-07-03
Payer: MEDICARE

## 2025-07-03 ENCOUNTER — APPOINTMENT (OUTPATIENT)
Dept: ORTHOPEDIC SURGERY | Facility: CLINIC | Age: 80
End: 2025-07-03
Payer: MEDICARE

## 2025-07-03 DIAGNOSIS — G56.03 BILATERAL CARPAL TUNNEL SYNDROME: ICD-10-CM

## 2025-07-03 DIAGNOSIS — M54.12 CERVICAL RADICULOPATHY AT C5: ICD-10-CM

## 2025-07-03 PROCEDURE — 95910 NRV CNDJ TEST 7-8 STUDIES: CPT

## 2025-07-08 ENCOUNTER — OFFICE VISIT (OUTPATIENT)
Dept: ORTHOPEDIC SURGERY | Facility: CLINIC | Age: 80
End: 2025-07-08
Payer: MEDICARE

## 2025-07-08 DIAGNOSIS — G56.03 BILATERAL CARPAL TUNNEL SYNDROME: Primary | ICD-10-CM

## 2025-07-08 PROCEDURE — 99212 OFFICE O/P EST SF 10 MIN: CPT | Performed by: ORTHOPAEDIC SURGERY

## 2025-07-08 PROCEDURE — 99213 OFFICE O/P EST LOW 20 MIN: CPT | Performed by: ORTHOPAEDIC SURGERY

## 2025-07-08 PROCEDURE — 1159F MED LIST DOCD IN RCRD: CPT | Performed by: ORTHOPAEDIC SURGERY

## 2025-07-08 PROCEDURE — 1036F TOBACCO NON-USER: CPT | Performed by: ORTHOPAEDIC SURGERY

## 2025-07-08 NOTE — PROGRESS NOTES
History present illness: Patient presents today for patient of symptoms of right carpal tunnel syndrome bilateral thumb CMC arthritis and left long finger trigger finger.  Recent nonoperative management by way of steroid injection.  She describes mild persistent numbness and tingling but is very pleased with the improvement of her symptoms compatible with carpal tunnel syndrome.  No left long finger triggering.  Thumbs are doing great as well.        Physical examination:  General: Alert and oriented to person, place, and time.  No acute distress and breathing comfortably: Pleasant and cooperative with examination.  Extremities: Evaluation of the bilateral upper extremities finds the patient had palpable radial artery at the wrists with brisk capillary refill to all digits.  Patient has intact sensation to axillary radial median and ulnar nerves.  There are no open wounds.  There are no signs of infection.  There is no evidence of lymphedema or lymphatic streaking.  The patient has supple compartments to bilateral arm forearm and hand.  Positive Tinel's of course of median nerve to right wrist.      Diagnostic studies: EMG shows evidence for severe right carpal tunnel syndrome.  Mild changes consistent with left carpal tunnel syndrome.  Mild right C6 radiculopathy.      Assessment: Bilateral thumb CMC arthritis right carpal tunnel syndrome and left long finger trigger finger responding well to initial nonoperative management      Plan: Treatment options were discussed.  Overall she is minimally symptomatic.  We did talk about surgical solutions for right carpal tunnel release including intraoperative techniques and postoperative protocols.  She declines for now in favor of simple observation.  Return when symptoms worsen whether it is triggering CMC pain or carpal tunnel syndrome and we will go from there.  Likely would shy away from additional carpal tunnel nonoperative management in favor of carpal tunnel release  if the patient is agreeable.  Will discuss that when she returns.      Procedure:        Procedure: